# Patient Record
Sex: FEMALE | Race: WHITE | NOT HISPANIC OR LATINO | Employment: OTHER | URBAN - METROPOLITAN AREA
[De-identification: names, ages, dates, MRNs, and addresses within clinical notes are randomized per-mention and may not be internally consistent; named-entity substitution may affect disease eponyms.]

---

## 2017-04-18 ENCOUNTER — ALLSCRIPTS OFFICE VISIT (OUTPATIENT)
Dept: OTHER | Facility: OTHER | Age: 77
End: 2017-04-18

## 2017-08-29 ENCOUNTER — TRANSCRIBE ORDERS (OUTPATIENT)
Dept: ADMINISTRATIVE | Facility: HOSPITAL | Age: 77
End: 2017-08-29

## 2017-08-29 DIAGNOSIS — Z12.31 VISIT FOR SCREENING MAMMOGRAM: Primary | ICD-10-CM

## 2017-08-31 ENCOUNTER — HOSPITAL ENCOUNTER (OUTPATIENT)
Dept: MAMMOGRAPHY | Facility: MEDICAL CENTER | Age: 77
Discharge: HOME | End: 2017-08-31
Payer: MEDICARE

## 2017-08-31 DIAGNOSIS — Z12.31 VISIT FOR SCREENING MAMMOGRAM: ICD-10-CM

## 2017-08-31 PROCEDURE — 77063 BREAST TOMOSYNTHESIS BI: CPT

## 2017-08-31 PROCEDURE — G0202 SCR MAMMO BI INCL CAD: HCPCS

## 2018-01-14 NOTE — RESULT NOTES
Verified Results  (1) URINE CULTURE 25Oct2016 05:35PM Jesus Sanchez     Test Name Result Flag Reference   CLINICAL REPORT (Report)     Test:        Urine culture  Specimen Source:  Urine, Unspecified Source  Specimen Type:   Urine  Specimen Date:   10/25/2016 5:35 PM  Result Date:    10/27/2016 11:56 AM  Result Status:   Final result  Resulting Lab:   [facility] [address] [place] [id number]            Tel: [phone]      CULTURE                                       ------------------                                   70,000-79,000 cfu/ml Escherichia coli    40,000-49,000 cfu/ml Mixed Contaminants X2      SUSCEPTIBILITY                                   ------------------                                                       Escherichia coli  METHOD                 LOLA  -------------------------------------  -------------------------  AMPICILLIN ($$)             >16 00 ug/ml Resistant  AMPICILLIN + SULBACTAM ($)       <=8/4 ug/ml  Susceptible  AZTREONAM ($$$)             <=8 ug/ml   Susceptible  CEFAZOLIN ($)              <=8 00 ug/ml Susceptible  CIPROFLOXACIN ($)            <=1 00 ug/ml Susceptible  GENTAMICIN ($$)             <=4 ug/ml   Susceptible  LEVOFLOXACIN ($)            <=2 00 ug/ml Susceptible  NITROFURANTOIN             <=32 ug/ml  Susceptible  PIPERACILLIN + TAZOBACTAM ($$$)     <=16 ug/ml  Susceptible  TETRACYCLINE              >8 ug/ml   Resistant  TOBRAMYCIN ($)             <=4 ug/ml   Susceptible  TRIMETHOPRIM + SULFAMETHOXAZOLE ($$$)  <=2/38 ug/ml Susceptible

## 2018-01-15 NOTE — RESULT NOTES
Verified Results  DXA FOLLOW UP (NO CHARGE) [id number] 11:00AM Dell Herrera    Order Number: [id number]    - Patient Instructions: To schedule this appointment, please contact Central Scheduling at [phone]  Test Name Result Flag Reference   DXA FOLLOW UP (NO CHARGE) (Report)     CENTRAL DXA SCAN     CLINICAL HISTORY:  77year old post-menopausal  female risk factors include postmenopausal, estrogen deficiency  TECHNIQUE: Bone densitometry was performed using a [employer] W bone densitometer  Regions of interest appear properly placed  There are no obvious fractures or other confounding variables which could limit the study  Degenerative or    osteoarthritic changes may be present on the spine image but not apparently affecting the T score result  COMPARISON: Baseline     RESULTS:    LUMBAR SPINE: L1-L4:   BMD 0 705 gm/cm2   T-score below normal, -3 1, osteoporosis  Z-score -0 7     LEFT TOTAL HIP:   BMD 0 800 gm/cm2   T-score below normal, -1 2   Z-score 0 6     LEFT FEMORAL NECK:   BMD 0 705 gm/cm2   T-score below normal, -1 3   Z-score 0 8     The forearm BMD is 0 492 and the T score is below normal, -3 4, osteoporosis  The Z score is -0 8  The Frax score in this patient identifies the risk of a major osteoporotic fracture in the next 10 years at 9 8% and a hip fracture risk of 1 7%  A 25-hydroxy vitamin D level and a comprehensive metabolic panel is suggested in view of the study results  Treatment is advised perhaps with intravenous Reclast or Prolia  IMPRESSION:   1  Based on the [facility] classification, this study identifies serious spine and forearm osteoporosis with only mild cortical osteopenia and the patient is considered at low risk for fracture       2  A daily intake of calcium of at least 1200 mg and vitamin D, 800-1000 IU, as well as weight bearing and muscle strengthening exercise, fall prevention and avoidance of tobacco and excessive alcohol intake as basic preventive measures are recommended  3  Repeat DXA scan on the same equipment in 18-24 months as clinically indicated  WHO CLASSIFICATION:   Normal (a T-score of -1 0 or higher)   Low bone mineral density (a T-score of less than -1 0 but higher than -2 5)   Osteoporosis (a T-score of -2 5 or less)   Severe osteoporosis (a T-score of -2 5 or less with a fragility fracture)     Thank you for allowing us the opportunity to participate in your patient care  The expanded DEXA report will no longer be arriving in your mail  If you desire to view the full report please contact [address] [place] [address] or access the PACS system  Workstation performed: [id number]     Signed by:   Jennifer Lazo MD   8/30/16     * DXA BONE DENSITY SPINE HIP AND PELVIS [id number] 10:38AM Carmelita López Order Number: [id number]    - Patient Instructions: To schedule this appointment, please contact Central Scheduling at [phone]  Test Name Result Flag Reference   DXA BONE DENSITY SPINE HIP AND PELVIS (Report)     CENTRAL DXA SCAN     CLINICAL HISTORY:  77year old post-menopausal  female risk factors include postmenopausal, estrogen deficiency  TECHNIQUE: Bone densitometry was performed using a [employer]'s W bone densitometer  Regions of interest appear properly placed  There are no obvious fractures or other confounding variables which could limit the study  Degenerative or    osteoarthritic changes may be present on the spine image but not apparently affecting the T score result  COMPARISON: Baseline     RESULTS:    LUMBAR SPINE: L1-L4:   BMD 0 705 gm/cm2   T-score below normal, -3 1, osteoporosis     Z-score -0 7     LEFT TOTAL HIP:   BMD 0 800 gm/cm2   T-score below normal, -1 2   Z-score 0 6     LEFT FEMORAL NECK:   BMD 0 705 gm/cm2   T-score below normal, -1 3   Z-score 0 8     The forearm BMD is 0 492 and the T score is below normal, -3 4, osteoporosis  The Z score is -0 8  The Frax score in this patient identifies the risk of a major osteoporotic fracture in the next 10 years at 9 8% and a hip fracture risk of 1 7%  A 25-hydroxy vitamin D level and a comprehensive metabolic panel is suggested in view of the study results  Treatment is advised perhaps with intravenous Reclast or Prolia  IMPRESSION:   1  Based on the [facility] classification, this study identifies serious spine and forearm osteoporosis with only mild cortical osteopenia and the patient is considered at low risk for fracture  2  A daily intake of calcium of at least 1200 mg and vitamin D, 800-1000 IU, as well as weight bearing and muscle strengthening exercise, fall prevention and avoidance of tobacco and excessive alcohol intake as basic preventive measures are recommended  3  Repeat DXA scan on the same equipment in 18-24 months as clinically indicated  WHO CLASSIFICATION:   Normal (a T-score of -1 0 or higher)   Low bone mineral density (a T-score of less than -1 0 but higher than -2 5)   Osteoporosis (a T-score of -2 5 or less)   Severe osteoporosis (a T-score of -2 5 or less with a fragility fracture)     Thank you for allowing us the opportunity to participate in your patient care  The expanded DEXA report will no longer be arriving in your mail  If you desire to view the full report please contact [phone] [facility] records or access the PACS system          Workstation performed: [id number]     Signed by:   Christen Monterroso MD   8/29/16

## 2018-01-15 NOTE — RESULT NOTES
Verified Results  (1) URINALYSIS (will reflex a microscopy if leukocytes, occult blood, protein or nitrites are not within normal limits) [id number] 12:47PM Henreitta Valdemar     Test Name Result Flag Reference   COLOR Yellow     CLARITY Cloudy     SPECIFIC GRAVITY UA 1 016  1 003-1 030   PH UA 6 0  4 5-8 0   LEUKOCYTE ESTERASE UA Trace A Negative   NITRITE UA Positive A Negative   PROTEIN UA Negative mg/dl  Negative   GLUCOSE UA Negative mg/dl  Negative   KETONES UA Negative mg/dl  Negative   UROBILINOGEN UA 0 2 E U /dl  0 2, 1 0 E U /dl   BILIRUBIN UA Negative  Negative   BLOOD UA Negative  Negative     (1) URINALYSIS (will reflex a microscopy if leukocytes, occult blood, protein or nitrites are not within normal limits) [id number] 12:47PM Henreitta Valdemar     Test Name Result Flag Reference   BACTERIA Innumerable /hpf A None Seen, Occasional   CALCIUM OXALATE CRYSTALS /HPF IN URINE SEDIMENT BY MICROSCOPY Moderate /hpf A None Seen   EPITHELIAL CELLS Occasional /hpf  None Seen, Occasional   RBC UA None Seen /hpf  None Seen   WBC UA None Seen /hpf  None Seen     (1) URINE CULTURE 19Oct2016 12:47PM Henreitta Valdemar     Test Name Result Flag Reference   CLINICAL REPORT (Report)     Test:        Urine culture  Specimen Source:  Urine, Unspecified Source  Specimen Type:   Urine  Specimen Date:   10/19/2016 12:47 PM  Result Date:    10/21/2016 7:25 AM  Result Status:   Final result  Resulting Lab:   [address] [address]            Tel: [phone]      CULTURE                                       ------------------                                   20,000-29,000 cfu/ml Mixed Contaminants X4     *** This organism has been edited  The previous result was Gram Negative Gallito      resembling Escherichia coli on 10/20/2016 at 1036 EDT   ***     (1) VAGINITIS/ VAGINOSIS, DNA ( AFFIRM) 19Oct2016 12:47PM Henreitta Valdemar     Test Name Result Flag Reference   CANDIDA SPECIES Negative  Negative   GARDNERELLA VAGINALIS Negative  Negative   TRICHOMONAS VAGINALIS Negative  Negative   Performed at:  [address]  [id number]  : Sanjana Ortega MD, Phone:  [phone]

## 2018-01-16 NOTE — PROGRESS NOTES
Chief Complaint  The patient was seen this morning for her second injection of Prolia  It was given subcutaneously in the right upper arm  [id number] lot number [id number] expiration date 06/2019  Active Problems   1  Burning with urination (788 1) (R30 0)  2  Depression (311) (F32 9)  3  Encounter for routine gynecological examination (V72 31) (Z01 419)  4  Encounter for screening mammogram for malignant neoplasm of breast (V76 12)   (Z12 31)  5  Hypertensive urgency (401 9) (I16 0)  6  Hypertriglyceridemia, essential (272 1) (E78 1)  7  Irritable bowel syndrome (564 1) (K58 9)  8  Osteoporosis (733 00) (M81 0)  9  Postmenopausal (V49 81) (Z78 0)  10  Postmenopausal atrophic vaginitis (627 3) (N95 2)  11  Primary hypothyroidism (244 9) (E03 9)  12  Right lower quadrant pain (789 03) (R10 31)  13  UTI (urinary tract infection) (599 0) (N39 0)  14  Vaginal atrophy (627 3) (N95 2)  15  Vaginal burning (625 8) (N94 9)    Current Meds  1  Calcium TABS; Therapy: (Recorded:19Sep2016) to Recorded  2  Estrace 0 1 MG/GM Vaginal Cream; USE AS DIRECTED; Therapy: [id number] to (Last Rx:18Oct2016) Ordered  3  Levothyroxine Sodium 100 MCG Oral Tablet; Therapy: (Recorded:23Sep2014) to Recorded  4  Nortriptyline HCl - 10 MG Oral Capsule; Therapy: (Recorded:23Sep2014) to Recorded  5  Pantoprazole Sodium 40 MG Oral Tablet Delayed Release; Therapy: (Recorded:23Sep2014) to Recorded  6  Sertraline HCl - 100 MG Oral Tablet; Therapy: (Recorded:83Huu5993) to Recorded  7  Simvastatin 40 MG Oral Tablet; Therapy: (Recorded:40Jtl9735) to Recorded  8  Topiramate 50 MG Oral Tablet; Therapy: (Recorded:72Qbj5846) to Recorded  9  Tricor 145 MG Oral Tablet; Therapy: (Recorded:75Dvj5376) to Recorded  10  Vitamin C CAPS; Therapy: (Recorded:28Puo2636) to Recorded  11  Vitamin D CAPS; Therapy: (Recorded:26Qem0071) to Recorded  12  Xanax 0 5 MG Oral Tablet; Therapy: (Recorded:23Sep2014) to Recorded    Allergies   1  Latex Exam Gloves MISC    Plan  Osteoporosis    · Administered: Prolia 60 MG/ML Subcutaneous Solution    Signatures   Electronically signed by :  JOSH Davis ; Apr 18 2017  3:01PM EST                       (Author)

## 2018-07-19 ENCOUNTER — TELEPHONE (OUTPATIENT)
Dept: OBGYN CLINIC | Facility: MEDICAL CENTER | Age: 78
End: 2018-07-19

## 2018-07-19 DIAGNOSIS — Z78.0 POST-MENOPAUSAL: Primary | ICD-10-CM

## 2018-08-24 ENCOUNTER — TRANSCRIBE ORDERS (OUTPATIENT)
Dept: ADMINISTRATIVE | Facility: HOSPITAL | Age: 78
End: 2018-08-24

## 2018-08-24 ENCOUNTER — HOSPITAL ENCOUNTER (OUTPATIENT)
Dept: BONE DENSITY | Facility: MEDICAL CENTER | Age: 78
Discharge: HOME | End: 2018-08-24
Payer: MEDICARE

## 2018-08-24 DIAGNOSIS — Z12.39 SCREENING BREAST EXAMINATION: Primary | ICD-10-CM

## 2018-08-24 DIAGNOSIS — Z78.0 POST-MENOPAUSAL: ICD-10-CM

## 2018-08-24 PROCEDURE — 77080 DXA BONE DENSITY AXIAL: CPT

## 2018-09-10 ENCOUNTER — ANNUAL EXAM (OUTPATIENT)
Dept: OBGYN CLINIC | Facility: MEDICAL CENTER | Age: 78
End: 2018-09-10
Payer: MEDICARE

## 2018-09-10 VITALS
HEIGHT: 59 IN | BODY MASS INDEX: 31.43 KG/M2 | SYSTOLIC BLOOD PRESSURE: 162 MMHG | WEIGHT: 155.9 LBS | DIASTOLIC BLOOD PRESSURE: 98 MMHG

## 2018-09-10 DIAGNOSIS — Z12.31 ENCOUNTER FOR SCREENING MAMMOGRAM FOR MALIGNANT NEOPLASM OF BREAST: ICD-10-CM

## 2018-09-10 DIAGNOSIS — N95.2 VAGINAL ATROPHY: ICD-10-CM

## 2018-09-10 DIAGNOSIS — M81.0 OSTEOPOROSIS, UNSPECIFIED OSTEOPOROSIS TYPE, UNSPECIFIED PATHOLOGICAL FRACTURE PRESENCE: ICD-10-CM

## 2018-09-10 DIAGNOSIS — Z01.419 ENCOUNTER FOR GYNECOLOGICAL EXAMINATION: Primary | ICD-10-CM

## 2018-09-10 PROCEDURE — G0101 CA SCREEN;PELVIC/BREAST EXAM: HCPCS | Performed by: OBSTETRICS & GYNECOLOGY

## 2018-09-10 RX ORDER — SERTRALINE HYDROCHLORIDE 100 MG/1
TABLET, FILM COATED ORAL
COMMUNITY

## 2018-09-10 RX ORDER — SIMVASTATIN 40 MG
TABLET ORAL
COMMUNITY

## 2018-09-10 RX ORDER — LISINOPRIL 20 MG/1
TABLET ORAL
COMMUNITY

## 2018-09-10 RX ORDER — NORTRIPTYLINE HYDROCHLORIDE 25 MG/1
CAPSULE ORAL
COMMUNITY

## 2018-09-10 RX ORDER — ASPIRIN 81 MG/1
81 TABLET, CHEWABLE ORAL
COMMUNITY
Start: 2016-04-24

## 2018-09-10 RX ORDER — ALPRAZOLAM 0.5 MG/1
TABLET ORAL
COMMUNITY
Start: 2018-04-16

## 2018-09-10 RX ORDER — LEVOTHYROXINE SODIUM 0.1 MG/1
TABLET ORAL
COMMUNITY

## 2018-09-10 RX ORDER — FENOFIBRATE 145 MG/1
TABLET, COATED ORAL
COMMUNITY

## 2018-09-10 RX ORDER — ESTRADIOL 0.1 MG/G
CREAM VAGINAL
Qty: 42.5 G | Refills: 1 | Status: SHIPPED | OUTPATIENT
Start: 2018-09-10 | End: 2020-07-01

## 2018-09-10 RX ORDER — TOPIRAMATE 25 MG/1
25 TABLET ORAL
COMMUNITY
Start: 2018-04-18

## 2018-09-10 RX ORDER — PANTOPRAZOLE SODIUM 40 MG/1
TABLET, DELAYED RELEASE ORAL
COMMUNITY
End: 2019-04-23

## 2018-09-10 NOTE — PROGRESS NOTES
ASSESSMENT & PLAN: Tereso Tierney is a 69 y o  [id number] with normal gynecologic exam     1   Routine well woman exam done today  2  Pap and HPV:  The patient's last pap and hpv was years ago   It was normal     Pap and cotesting was not done today  Current ASCCP Guidelines reviewed  3   Mammogram ordered  4  Colonoscopy   5  DEXA - recent one showing worsening from  2016 - we discussed Prolia use in detail   6  The following were reviewed in today's visit: mammography screening ordered, family planning choices, menopause, osteoporosis, adequate intake of calcium and vitamin D, exercise and healthy diet  7  Vulvar itching : recommend restarting estrogen cream - if no improvement aware I would recommend an biopsy     CC:  Annual Gynecologic Examination    HPI: Teerso Tierney is a 69 y o  [id number] who presents for annual gynecologic examination  She has the following concerns:  Feels overwhelmed as  of 48 years might have dementia     Health Maintenance:    She wears her seatbelt routinely  She does perform regular monthly self breast exams  She feels safe at home  Past Medical History:   Diagnosis Date    Disease of thyroid gland     High cholesterol     Hypertension     IBS (irritable bowel syndrome)     Migraines        Past Surgical History:   Procedure Laterality Date    BILATERAL SALPINGOOPHORECTOMY       SECTION      DILATION AND CURETTAGE OF UTERUS      HEMORRHOID SURGERY      HERNIA REPAIR      TONSILLECTOMY      TOTAL ABDOMINAL HYSTERECTOMY      VAGINAL DELIVERY         Past OB/Gyn History:  OB History      Para Term  AB Living    2 2 2     2    SAB TAB Ectopic Multiple Live Births            2          History reviewed  No pertinent family history      Social History:  Social History     Social History    Marital status: /Civil Union     Spouse name: N/A    Number of children: N/A    Years of education: N/A     Occupational History    Not on file  Social History Main Topics    Smoking status: Never Smoker    Smokeless tobacco: Never Used    Alcohol use No    Drug use: No    Sexual activity: Not on file     Other Topics Concern    Not on file     Social History Narrative    No narrative on file       Allergies   Allergen Reactions    Latex          Current Outpatient Prescriptions:     ALPRAZolam (XANAX) 0 5 mg tablet, Take 1 tablet by mouth twice daily  , Disp: , Rfl:     aspirin 81 mg chewable tablet, Chew 81 mg, Disp: , Rfl:     topiramate (TOPAMAX) 25 mg tablet, Take 25 mg by mouth, Disp: , Rfl:     Calcium Carb-Cholecalciferol (CALCIUM 1000 + D) 1000-800 MG-UNIT TABS, Take by mouth, Disp: , Rfl:     estradiol (ESTRACE) 0 1 mg/g vaginal cream, Apply twice weekly  , Disp: 42 5 g, Rfl: 1    Famotidine-Ca Carb-Mag Hydrox (PEPCID COMPLETE PO), 800mg, Disp: , Rfl:     fenofibrate (TRICOR) 145 mg tablet, fenofibrate nanocrystallized 145 mg tablet, Disp: , Rfl:     levothyroxine 100 mcg tablet, levothyroxine 100 mcg tablet, Disp: , Rfl:     lisinopril (ZESTRIL) 20 mg tablet, lisinopril 20 mg tablet, Disp: , Rfl:     Multiple Vitamins-Minerals (CENTRUM SILVER PO), Centrum Silver, Disp: , Rfl:     nortriptyline (PAMELOR) 25 mg capsule, nortriptyline 25 mg capsule, Disp: , Rfl:     pantoprazole (PROTONIX) 40 mg tablet, pantoprazole 40 mg tablet,delayed release, Disp: , Rfl:     sertraline (ZOLOFT) 100 mg tablet, sertraline 100 mg tablet, Disp: , Rfl:     simvastatin (ZOCOR) 40 mg tablet,  40 mg by oral route , Disp: , Rfl:     Review of Systems  Constitutional :no fever, feels well, no tiredness, no recent weight gain or loss  ENT: no ear ache, no loss of hearing, no nosebleeds or nasal discharge, no sore throat or hoarseness  Cardiovascular: no complaints of slow or fast heart beat, no chest pain, no palpitations, no leg claudication or lower extremity edema    Respiratory: no complaints of shortness of shortness of breath, no KIRKPATRICK  Breasts:no complaints of breast pain, breast lump, or nipple discharge  Gastrointestinal: no complaints of abdominal pain, constipation, nausea, vomiting, or diarrhea or bloody stools  Genitourinary : as noted in HPI  Musculoskeletal: no complaints of arthralgia, no myalgia, no joint swelling or stiffness, no limb pain or swelling  Integumentary: no complaints of skin rash or lesion, itching or dry skin  Neurological: no complaints of headache, no confusion, no numbness or tingling, no dizziness or fainting    Objective      /98   Ht 4' 11" (1 499 m)   Wt 70 7 kg (155 lb 14 4 oz)   LMP  (LMP Unknown)   Breastfeeding? No   BMI 31 49 kg/m²   General:   appears stated age, cooperative, alert normal mood and affect   Heart: regular rate and rhythm, S1, S2 normal, no murmur, click, rub or gallop   Lungs: clear to auscultation bilaterally   Breasts: Normal to palpation without dominant masses   Abdomen: soft, non-tender, without masses or organomegaly   Vulva: normal   Vagina: normal vagina, no discharge, exudate, lesion, or erythema   Urethra: normal   Cervix: Absent    Uterus: Absent    Adnexa: surgically absent   Lymphatic palpation of lymph nodes in neck, axilla, groin and/or other locations: no lymphadenopathy or masses noted   Skin normal skin turgor and no rashes     Psychiatric orientation to person, place, and time: normal  mood and affect: normal

## 2018-09-11 ENCOUNTER — TELEPHONE (OUTPATIENT)
Dept: OBGYN CLINIC | Facility: MEDICAL CENTER | Age: 78
End: 2018-09-11

## 2018-09-11 NOTE — TELEPHONE ENCOUNTER
----- Message from Samuel Posadas sent at 9/11/2018 11:07 AM EDT -----  Regarding: RE: Precert   Benefits verification submitted    ----- Message -----  From: Aiyana Almeida  Sent: 9/10/2018   2:31 PM  To: Samuel Drivers  Subject: Precert                                          Please precert Prolia for pt

## 2018-09-19 ENCOUNTER — HOSPITAL ENCOUNTER (OUTPATIENT)
Dept: MAMMOGRAPHY | Facility: MEDICAL CENTER | Age: 78
Discharge: HOME | End: 2018-09-19
Payer: MEDICARE

## 2018-09-19 DIAGNOSIS — Z12.31 ENCOUNTER FOR SCREENING MAMMOGRAM FOR MALIGNANT NEOPLASM OF BREAST: ICD-10-CM

## 2018-09-19 PROCEDURE — 77067 SCR MAMMO BI INCL CAD: CPT

## 2018-09-19 PROCEDURE — 77063 BREAST TOMOSYNTHESIS BI: CPT

## 2018-11-08 ENCOUNTER — TELEPHONE (OUTPATIENT)
Dept: OBGYN CLINIC | Facility: MEDICAL CENTER | Age: 78
End: 2018-11-08

## 2018-11-08 NOTE — TELEPHONE ENCOUNTER
I contacted prolia to check pt's summary of benefits that were submitted in September  Pt has medicare and met her $183 deductible  Pt will have 20% coinsurance  Pt has Herlinda Garcia has a secondary plan and Herlinda Garcia will cover remaining 20%  Pt is aware  Prolia ordered and should be here tomorrow  Appt scheduled       Edgar at [facility] 11/8/18

## 2018-11-27 ENCOUNTER — CLINICAL SUPPORT (OUTPATIENT)
Dept: OBGYN CLINIC | Facility: MEDICAL CENTER | Age: 78
End: 2018-11-27
Payer: MEDICARE

## 2018-11-27 DIAGNOSIS — M81.0 OSTEOPOROSIS, UNSPECIFIED OSTEOPOROSIS TYPE, UNSPECIFIED PATHOLOGICAL FRACTURE PRESENCE: Primary | ICD-10-CM

## 2018-11-27 PROCEDURE — 96372 THER/PROPH/DIAG INJ SC/IM: CPT | Performed by: OBSTETRICS & GYNECOLOGY

## 2018-11-27 NOTE — PROGRESS NOTES
Here for prolia injection  Patient supplied    Given SQ left arm  NDC# [id number]  Lot# [id number]  Exp 1/2021

## 2019-05-28 ENCOUNTER — CLINICAL SUPPORT (OUTPATIENT)
Dept: OBGYN CLINIC | Facility: MEDICAL CENTER | Age: 79
End: 2019-05-28
Payer: MEDICARE

## 2019-05-28 DIAGNOSIS — M81.0 OSTEOPOROSIS, UNSPECIFIED OSTEOPOROSIS TYPE, UNSPECIFIED PATHOLOGICAL FRACTURE PRESENCE: Primary | ICD-10-CM

## 2019-05-28 PROCEDURE — 96372 THER/PROPH/DIAG INJ SC/IM: CPT | Performed by: OBSTETRICS & GYNECOLOGY

## 2019-09-03 ENCOUNTER — TRANSCRIBE ORDERS (OUTPATIENT)
Dept: ADMINISTRATIVE | Facility: HOSPITAL | Age: 79
End: 2019-09-03

## 2019-09-03 DIAGNOSIS — Z12.31 ENCOUNTER FOR SCREENING MAMMOGRAM FOR MALIGNANT NEOPLASM OF BREAST: Primary | ICD-10-CM

## 2019-10-17 ENCOUNTER — HOSPITAL ENCOUNTER (OUTPATIENT)
Dept: MAMMOGRAPHY | Facility: MEDICAL CENTER | Age: 79
Discharge: HOME | End: 2019-10-17
Payer: MEDICARE

## 2019-10-17 VITALS — HEIGHT: 59 IN | BODY MASS INDEX: 30.04 KG/M2 | WEIGHT: 149 LBS

## 2019-10-17 DIAGNOSIS — Z12.31 ENCOUNTER FOR SCREENING MAMMOGRAM FOR MALIGNANT NEOPLASM OF BREAST: ICD-10-CM

## 2019-10-17 PROCEDURE — 77063 BREAST TOMOSYNTHESIS BI: CPT

## 2019-10-17 PROCEDURE — 77067 SCR MAMMO BI INCL CAD: CPT

## 2020-01-21 ENCOUNTER — TELEPHONE (OUTPATIENT)
Dept: OBGYN CLINIC | Facility: MEDICAL CENTER | Age: 80
End: 2020-01-21

## 2020-01-22 ENCOUNTER — TELEPHONE (OUTPATIENT)
Dept: OBGYN CLINIC | Facility: MEDICAL CENTER | Age: 80
End: 2020-01-22

## 2020-01-23 ENCOUNTER — TELEPHONE (OUTPATIENT)
Dept: OBGYN CLINIC | Facility: MEDICAL CENTER | Age: 80
End: 2020-01-23

## 2020-01-23 NOTE — TELEPHONE ENCOUNTER
----- Message from Obi Ochoa sent at 1/23/2020  9:51 AM EST -----  Regarding: RE: Nehal Florentino  lmovm for pt to cb     ----- Message -----  From: Cristhian Singer  Sent: 1/22/2020   2:55 PM EST  To: Obi Ochoa  Subject: Prolia                                           The patient's  called and stated that he had some concerns about the Prolia injection for his wife  He stated that he called their new insurance which is [employer] and they stated that they would have to pay a $250 at the time of her visit  So at this time they canceled her appt and are coming in to see Dr Leyla Tejeda in the beginning of February for a discussion

## 2020-02-07 ENCOUNTER — OFFICE VISIT (OUTPATIENT)
Dept: OBGYN CLINIC | Facility: MEDICAL CENTER | Age: 80
End: 2020-02-07
Payer: COMMERCIAL

## 2020-02-07 VITALS — WEIGHT: 149.6 LBS | SYSTOLIC BLOOD PRESSURE: 112 MMHG | DIASTOLIC BLOOD PRESSURE: 74 MMHG | BODY MASS INDEX: 30.22 KG/M2

## 2020-02-07 DIAGNOSIS — M81.0 OSTEOPOROSIS, UNSPECIFIED OSTEOPOROSIS TYPE, UNSPECIFIED PATHOLOGICAL FRACTURE PRESENCE: Primary | ICD-10-CM

## 2020-02-07 PROCEDURE — 99213 OFFICE O/P EST LOW 20 MIN: CPT | Performed by: OBSTETRICS & GYNECOLOGY

## 2020-02-07 NOTE — PROGRESS NOTES
Assessment Sandrine Hoover was seen today for consult  Diagnoses and all orders for this visit:    Osteoporosis, unspecified osteoporosis type, unspecified pathological fracture presence  -     DXA bone density spine hip and pelvis; Future         Plan:  Ordered repeat DEXA as patient has been over a year on Prolia to see if improvement   We discussed measures to prevent falls   Will have office check If there is any financial help with prolia or reclast as patient unable to tolerate PO meds   Will schedule annual visit     Gina Garvin is a 67 y o  female here for a problem visit  Patient is here to discuss treatment options for osteoporosis as she cannot afford Prolia   States she cant tolerate a PO form as she has IBS and frequent GERD   There is no problem list on file for this patient  Gynecologic History  No LMP recorded (lmp unknown)  Patient has had a hysterectomy  The current method of family planning is post menopausal status      Past Medical History:   Diagnosis Date    Disease of thyroid gland     High cholesterol     Hypertension     IBS (irritable bowel syndrome)     Migraines      Past Surgical History:   Procedure Laterality Date    BILATERAL SALPINGOOPHORECTOMY      CARPAL TUNNEL RELEASE Right      SECTION      DILATION AND CURETTAGE OF UTERUS      HEMORRHOID SURGERY      HERNIA REPAIR      OOPHORECTOMY Bilateral     age 37    TONSILLECTOMY      TOTAL ABDOMINAL HYSTERECTOMY      age 37   [facility] VAGIN[facility] DELIVERY       Family History   Problem Relation Age of Onset    No Known Problems Mother     No Known Problems Father     No Known Problems Maternal Grandmother     No Known Problems Maternal Grandfather     No Known Problems Paternal Grandfather     No Known Problems Daughter     No Known Problems Daughter     No Known Problems Maternal Aunt     No Known Problems Maternal Aunt     No Known Problems Maternal Aunt     Pancreatic cancer Paternal Uncle Social History     Socioeconomic History    Marital status: /Civil Union     Spouse name: Not on file    Number of children: Not on file    Years of education: Not on file    Highest education level: Not on file   Occupational History    Not on file   Social Needs    Financial resource strain: Not on file    Food insecurity:     Worry: Not on file     Inability: Not on file    Transportation needs:     Medical: Not on file     Non-medical: Not on file   Tobacco Use    Smoking status: Never Smoker    Smokeless tobacco: Never Used   Substance and Sexual Activity    Alcohol use: No    Drug use: No    Sexual activity: Not on file   Lifestyle    Physical activity:     Days per week: Not on file     Minutes per session: Not on file    Stress: Not on file   Relationships    Social connections:     Talks on phone: Not on file     Gets together: Not on file     Attends Sabianist service: Not on file     Active member of club or organization: Not on file     Attends meetings of clubs or organizations: Not on file     Relationship status: Not on file    Intimate partner violence:     Fear of current or ex partner: Not on file     Emotionally abused: Not on file     Physically abused: Not on file     Forced sexual activity: Not on file   Other Topics Concern    Not on file   Social History Narrative    Not on file     Allergies   Allergen Reactions    Latex        Current Outpatient Medications:     ALPRAZolam (XANAX) 0 5 mg tablet, Take 1 tablet by mouth twice daily  , Disp: , Rfl:     aspirin 81 mg chewable tablet, Chew 81 mg, Disp: , Rfl:     Calcium Carb-Cholecalciferol (CALCIUM 1000 + D) 1000-800 MG-UNIT TABS, Take by mouth, Disp: , Rfl:     famotidine (PEPCID) 40 MG tablet, 1  Hr before dinner, Disp: 90 tablet, Rfl: 3    fenofibrate (TRICOR) 145 mg tablet, fenofibrate nanocrystallized 145 mg tablet, Disp: , Rfl:     levothyroxine 100 mcg tablet, levothyroxine 100 mcg tablet, Disp: , Rfl:     lisinopril (ZESTRIL) 20 mg tablet, lisinopril 20 mg tablet, Disp: , Rfl:     Multiple Vitamins-Minerals (CENTRUM SILVER PO), Centrum Silver, Disp: , Rfl:     nortriptyline (PAMELOR) 25 mg capsule, nortriptyline 25 mg capsule, Disp: , Rfl:     pantoprazole (PROTONIX) 40 mg tablet, Take 1 tablet (40 mg total) by mouth daily for 360 days, Disp: 90 tablet, Rfl: 3    sertraline (ZOLOFT) 100 mg tablet, sertraline 100 mg tablet, Disp: , Rfl:     simvastatin (ZOCOR) 40 mg tablet,  40 mg by oral route , Disp: , Rfl:     topiramate (TOPAMAX) 25 mg tablet, Take 25 mg by mouth, Disp: , Rfl:     estradiol (ESTRACE) 0 1 mg/g vaginal cream, Apply twice weekly  (Patient not taking: Reported on 2/7/2020), Disp: 42 5 g, Rfl: 1    hydrochlorothiazide (MICROZIDE) 12 5 mg capsule, , Disp: , Rfl:     Review of Systems  Constitutional :no fever, feels well, no tiredness, no recent weight gain or loss  ENT: no ear ache, no loss of hearing, no nosebleeds or nasal discharge, no sore throat or hoarseness  Cardiovascular: no complaints of slow or fast heart beat, no chest pain, no palpitations, no leg claudication or lower extremity edema  Respiratory: no complaints of shortness of shortness of breath, no KIRKPATRICK  Breasts:no complaints of breast pain, breast lump, or nipple discharge  Gastrointestinal: no complaints of abdominal pain, constipation, nausea, vomiting, or diarrhea or bloody stools  Genitourinary : no complaints of dysuria, incontinence, pelvic pain, no dysmenorrhea, vaginal discharge or abnormal vaginal bleeding and as noted in HPI  Musculoskeletal: no complaints of arthralgia, no myalgia, no joint swelling or stiffness, no limb pain or swelling    Integumentary: no complaints of skin rash or lesion, itching or dry skin  Neurological: no complaints of headache, no confusion, no numbness or tingling, no dizziness or fainting     Objective     /74   Wt 67 9 kg (149 lb 9 6 oz)   LMP  (LMP Unknown)   BMI 30 22 kg/m²     General:   appears stated age, cooperative, alert normal mood and affect   Psychiatric orientation to person, place, and time: normal  mood and affect: normal

## 2020-02-11 ENCOUNTER — TELEPHONE (OUTPATIENT)
Dept: OBGYN CLINIC | Facility: MEDICAL CENTER | Age: 80
End: 2020-02-11

## 2020-02-11 NOTE — TELEPHONE ENCOUNTER
----- Message from Sita Lema sent at 2/11/2020  2:35 PM EST -----  Pt aware  Will call the number below and give me a cb to let me know what they say    ----- Message -----  From: Sita Lema  Sent: 2/11/2020   9:51 AM EST  To: Sita Lema    lmov for pt to cb  I contacted prolia copay assistance program and spoke with [facility]  Since pt has a medicare advantage plan, they cannot help with pt but the [facility] can  Please have pt call [phone]  They will ask pt information about her insurance and income, and can hopefully cover the whole $250 copay      ----- Message -----  From: Sita Lema  Sent: 2/7/2020  12:12 PM EST  To: Sita Lmea    Please call prolia to see if they offer any copay assistance programs  Pt has a $250 copay  Cannot afford it  If they do not offer anything, please offer reclast at the infusion center  Pt will be going for a repeat dexa

## 2020-02-11 NOTE — TELEPHONE ENCOUNTER
----- Message from Arelis Granados sent at 2/11/2020  9:51 AM EST -----  lmovm for pt to cb  I contacted prolia copay assistance program and spoke with [facility]  Since pt has a medicare advantage plan, they cannot help with pt but the [facility] can  Please have pt call [phone]  They will ask pt information about her insurance and income, and can hopefully cover the whole $250 copay      ----- Message -----  From: Arelis Granados  Sent: 2/7/2020  12:12 PM EST  To: Arelis Granados    Please call [employer] to see if they offer any copay assistance programs  Pt has a $250 copay  Cannot afford it  If they do not offer anything, please offer reclast at the infusion center  Pt will be going for a repeat dexa

## 2020-02-14 ENCOUNTER — TELEPHONE (OUTPATIENT)
Dept: OBGYN CLINIC | Facility: MEDICAL CENTER | Age: 80
End: 2020-02-14

## 2020-02-14 NOTE — TELEPHONE ENCOUNTER
----- Message from Layla Cruz sent at 2/14/2020 11:50 AM EST -----  I contacted   5 units of the reclast will cost $1880  20% is $376 00 and the admin will cost pt $31 40  CPT codes  and 44076  Total oop cost for pt is $407 40  I spoke with pt and she is aware  Her and  both still state that is too much and they cannot afford it  They both prefer to hold off on treatment for now until the [facility] opens up again  They will contact them also to check status and once they have an answer, contact us      ----- Message -----  From: Layla Cruz  Sent: 2/11/2020   3:06 PM EST  To: Layla Cruz    Please call to find out how much reclast will cost     I spoke with pt  States she called the [employer] and the funding is closed  States she was told when it reopens, they will contact her

## 2020-06-23 ENCOUNTER — TELEPHONE (OUTPATIENT)
Dept: OBGYN CLINIC | Facility: MEDICAL CENTER | Age: 80
End: 2020-06-23

## 2020-07-01 ENCOUNTER — ANNUAL EXAM (OUTPATIENT)
Dept: OBGYN CLINIC | Facility: MEDICAL CENTER | Age: 80
End: 2020-07-01
Payer: COMMERCIAL

## 2020-07-01 VITALS — TEMPERATURE: 99.5 F | SYSTOLIC BLOOD PRESSURE: 132 MMHG | DIASTOLIC BLOOD PRESSURE: 84 MMHG

## 2020-07-01 DIAGNOSIS — M81.0 OSTEOPOROSIS, UNSPECIFIED OSTEOPOROSIS TYPE, UNSPECIFIED PATHOLOGICAL FRACTURE PRESENCE: ICD-10-CM

## 2020-07-01 DIAGNOSIS — Z01.419 ENCOUNTER FOR ANNUAL ROUTINE GYNECOLOGICAL EXAMINATION: Primary | ICD-10-CM

## 2020-07-01 DIAGNOSIS — Z12.31 ENCOUNTER FOR SCREENING MAMMOGRAM FOR MALIGNANT NEOPLASM OF BREAST: ICD-10-CM

## 2020-07-01 PROCEDURE — G0101 CA SCREEN;PELVIC/BREAST EXAM: HCPCS | Performed by: OBSTETRICS & GYNECOLOGY

## 2020-07-02 NOTE — PROGRESS NOTES
ASSESSMENT & PLAN: Abena Vanegas is a 67 y o  [id number] with normal gynecologic exam     1   Routine well woman exam done today  2  Pap and HPV:  Not indicated had hysterectomy   Current ASCCP Guidelines reviewed  3   Mammogram ordered  4  Colorectal cancer screening was notordered  5   The following were reviewed in today's visit: breast self exam, mammography screening ordered, menopause, osteoporosis, adequate intake of calcium and vitamin D, exercise and healthy diet  CC:  Annual Gynecologic Examination    HPI: Abena Vanegas is a 67 y o  [id number] who presents for annual gynecologic examination  She has the following concerns:  None     Health Maintenance:    She wears her seatbelt routinely  She does perform regular monthly self breast exams  She feels safe at home       Past Medical History:   Diagnosis Date    Disease of thyroid gland     High cholesterol     Hypertension     IBS (irritable bowel syndrome)     Migraines        Past Surgical History:   Procedure Laterality Date    BILATERAL SALPINGOOPHORECTOMY      CARPAL TUNNEL RELEASE Right      SECTION      DILATION AND CURETTAGE OF UTERUS      HEMORRHOID SURGERY      HERNIA REPAIR      OOPHORECTOMY Bilateral     age 37    TONSILLECTOMY      TOTAL ABDOMINAL HYSTERECTOMY      age 37   [address]         Past OB/Gyn History:  OB History        2    Para   2    Term   2            AB        Living   2       SAB        TAB        Ectopic        Multiple        Live Births   2                   Family History   Problem Relation Age of Onset    No Known Problems Mother     No Known Problems Father     No Known Problems Maternal Grandmother     No Known Problems Maternal Grandfather     No Known Problems Paternal Grandfather     No Known Problems Daughter     No Known Problems Daughter     No Known Problems Maternal Aunt     No Known Problems Maternal Aunt     No Known Problems Maternal Aunt     Pancreatic cancer Paternal Uncle        Social History:  Social History     Socioeconomic History    Marital status: /Civil Union     Spouse name: Not on file    Number of children: Not on file    Years of education: Not on file    Highest education level: Not on file   Occupational History    Not on file   Social Needs    Financial resource strain: Not on file    Food insecurity:     Worry: Not on file     Inability: Not on file    Transportation needs:     Medical: Not on file     Non-medical: Not on file   Tobacco Use    Smoking status: Never Smoker    Smokeless tobacco: Never Used   Substance and Sexual Activity    Alcohol use: No    Drug use: No    Sexual activity: Not on file   Lifestyle    Physical activity:     Days per week: Not on file     Minutes per session: Not on file    Stress: Not on file   Relationships    Social connections:     Talks on phone: Not on file     Gets together: Not on file     Attends Islam service: Not on file     Active member of club or organization: Not on file     Attends meetings of clubs or organizations: Not on file     Relationship status: Not on file    Intimate partner violence:     Fear of current or ex partner: Not on file     Emotionally abused: Not on file     Physically abused: Not on file     Forced sexual activity: Not on file   Other Topics Concern    Not on file   Social History Narrative    Not on file         Allergies   Allergen Reactions    Latex          Current Outpatient Medications:     ALPRAZolam (XANAX) 0 5 mg tablet, Take 1 tablet by mouth twice daily  , Disp: , Rfl:     aspirin 81 mg chewable tablet, Chew 81 mg, Disp: , Rfl:     Calcium Carb-Cholecalciferol (CALCIUM 1000 + D) 1000-800 MG-UNIT TABS, Take by mouth, Disp: , Rfl:     famotidine (PEPCID) 40 MG tablet, 1  Hr before dinner, Disp: 90 tablet, Rfl: 3    fenofibrate (TRICOR) 145 mg tablet, fenofibrate nanocrystallized 145 mg tablet, Disp: , Rfl:    hydrochlorothiazide (MICROZIDE) 12 5 mg capsule, , Disp: , Rfl:     levothyroxine 100 mcg tablet, levothyroxine 100 mcg tablet, Disp: , Rfl:     lisinopril (ZESTRIL) 20 mg tablet, lisinopril 20 mg tablet, Disp: , Rfl:     Multiple Vitamins-Minerals (CENTRUM SILVER PO), Centrum Silver, Disp: , Rfl:     nortriptyline (PAMELOR) 25 mg capsule, nortriptyline 25 mg capsule, Disp: , Rfl:     pantoprazole (PROTONIX) 40 mg tablet, Take 1 tablet (40 mg total) by mouth daily for 360 days, Disp: 90 tablet, Rfl: 3    sertraline (ZOLOFT) 100 mg tablet, sertraline 100 mg tablet, Disp: , Rfl:     simvastatin (ZOCOR) 40 mg tablet,  40 mg by oral route , Disp: , Rfl:     topiramate (TOPAMAX) 25 mg tablet, Take 25 mg by mouth, Disp: , Rfl:     Review of Systems  Constitutional :no fever, feels well, no tiredness, no recent weight gain or loss  ENT: no ear ache, no loss of hearing, no nosebleeds or nasal discharge, no sore throat or hoarseness  Cardiovascular: no complaints of slow or fast heart beat, no chest pain, no palpitations, no leg claudication or lower extremity edema  Respiratory: no complaints of shortness of shortness of breath, no KIRKPATRICK  Breasts:no complaints of breast pain, breast lump, or nipple discharge  Gastrointestinal: no complaints of abdominal pain, constipation, nausea, vomiting, or diarrhea or bloody stools  Genitourinary : no complaints of dysuria, incontinence, pelvic pain, no dysmenorrhea, vaginal discharge or abnormal vaginal bleeding and as noted in HPI  Musculoskeletal: no complaints of arthralgia, no myalgia, no joint swelling or stiffness, no limb pain or swelling    Integumentary: no complaints of skin rash or lesion, itching or dry skin  Neurological: no complaints of headache, no confusion, no numbness or tingling, no dizziness or fainting    Objective      /84   Temp 99 5 °F (37 5 °C)   LMP  (LMP Unknown)   Breastfeeding No   General:   appears stated age, cooperative, alert normal mood and affect   Lungs: Unlabored breathing    Breasts: normal appearance, no masses or tenderness   Abdomen: soft, non-tender, without masses or organomegaly   Vulva: normal   Vagina: normal vagina, no discharge, exudate, lesion, or erythema   Urethra: normal   Cervix: Absent  Uterus: uterus absent   Adnexa: no mass, fullness, tenderness   Lymphatic palpation of lymph nodes in neck, axilla, groin and/or other locations: no lymphadenopathy or masses noted   Skin normal skin turgor and no rashes     Psychiatric orientation to person, place, and time: normal  mood and affect: normal

## 2020-08-25 ENCOUNTER — HOSPITAL ENCOUNTER (OUTPATIENT)
Dept: BONE DENSITY | Facility: MEDICAL CENTER | Age: 80
Discharge: HOME | End: 2020-08-25
Payer: COMMERCIAL

## 2020-08-25 DIAGNOSIS — M81.0 OSTEOPOROSIS, UNSPECIFIED OSTEOPOROSIS TYPE, UNSPECIFIED PATHOLOGICAL FRACTURE PRESENCE: ICD-10-CM

## 2020-08-25 PROCEDURE — 77080 DXA BONE DENSITY AXIAL: CPT

## 2020-12-09 ENCOUNTER — HOSPITAL ENCOUNTER (OUTPATIENT)
Dept: MAMMOGRAPHY | Facility: MEDICAL CENTER | Age: 80
Discharge: HOME | End: 2020-12-09
Payer: COMMERCIAL

## 2020-12-09 DIAGNOSIS — Z12.31 ENCOUNTER FOR SCREENING MAMMOGRAM FOR MALIGNANT NEOPLASM OF BREAST: ICD-10-CM

## 2020-12-09 PROCEDURE — 77063 BREAST TOMOSYNTHESIS BI: CPT

## 2020-12-09 PROCEDURE — 77067 SCR MAMMO BI INCL CAD: CPT

## 2022-02-24 PROBLEM — Z98.890 HX OF COLONOSCOPY: Status: ACTIVE | Noted: 2022-02-19

## 2022-03-09 ENCOUNTER — HOSPITAL ENCOUNTER (OUTPATIENT)
Dept: MAMMOGRAPHY | Facility: MEDICAL CENTER | Age: 82
Discharge: HOME | End: 2022-03-09
Payer: COMMERCIAL

## 2022-03-09 VITALS — HEIGHT: 59 IN | BODY MASS INDEX: 32.67 KG/M2 | WEIGHT: 162.04 LBS

## 2022-03-09 DIAGNOSIS — Z12.31 ENCOUNTER FOR SCREENING MAMMOGRAM FOR MALIGNANT NEOPLASM OF BREAST: ICD-10-CM

## 2022-03-09 PROCEDURE — 77067 SCR MAMMO BI INCL CAD: CPT

## 2022-03-09 PROCEDURE — 77063 BREAST TOMOSYNTHESIS BI: CPT

## 2022-03-22 PROCEDURE — 88305 TISSUE EXAM BY PATHOLOGIST: CPT | Performed by: PATHOLOGY

## 2022-03-23 ENCOUNTER — LAB REQUISITION (OUTPATIENT)
Dept: LAB | Facility: HOSPITAL | Age: 82
End: 2022-03-23
Payer: COMMERCIAL

## 2022-03-23 DIAGNOSIS — K21.9 GASTRO-ESOPHAGEAL REFLUX DISEASE WITHOUT ESOPHAGITIS: ICD-10-CM

## 2022-03-23 DIAGNOSIS — K20.81 OTHER ESOPHAGITIS WITH BLEEDING: ICD-10-CM

## 2023-10-12 ENCOUNTER — HOSPITAL ENCOUNTER (OUTPATIENT)
Dept: MAMMOGRAPHY | Facility: MEDICAL CENTER | Age: 83
Discharge: HOME | End: 2023-10-12
Payer: COMMERCIAL

## 2023-10-12 VITALS — WEIGHT: 154.1 LBS | HEIGHT: 58 IN | BODY MASS INDEX: 32.35 KG/M2

## 2023-10-12 DIAGNOSIS — Z12.31 ENCOUNTER FOR SCREENING MAMMOGRAM FOR MALIGNANT NEOPLASM OF BREAST: ICD-10-CM

## 2023-10-12 PROCEDURE — 77063 BREAST TOMOSYNTHESIS BI: CPT

## 2023-10-12 PROCEDURE — 77067 SCR MAMMO BI INCL CAD: CPT

## 2024-01-10 PROCEDURE — 88305 TISSUE EXAM BY PATHOLOGIST: CPT | Performed by: SPECIALIST

## 2024-01-11 ENCOUNTER — LAB REQUISITION (OUTPATIENT)
Dept: LAB | Facility: HOSPITAL | Age: 84
End: 2024-01-11
Payer: COMMERCIAL

## 2024-01-11 DIAGNOSIS — K63.5 POLYP OF COLON: ICD-10-CM

## 2024-01-11 DIAGNOSIS — Z12.11 ENCOUNTER FOR SCREENING FOR MALIGNANT NEOPLASM OF COLON: ICD-10-CM

## 2024-01-15 PROCEDURE — 88305 TISSUE EXAM BY PATHOLOGIST: CPT | Performed by: SPECIALIST
